# Patient Record
Sex: FEMALE | Race: WHITE
[De-identification: names, ages, dates, MRNs, and addresses within clinical notes are randomized per-mention and may not be internally consistent; named-entity substitution may affect disease eponyms.]

---

## 2017-12-12 ENCOUNTER — RX RENEWAL (OUTPATIENT)
Age: 22
End: 2017-12-12

## 2018-01-31 ENCOUNTER — APPOINTMENT (OUTPATIENT)
Dept: OBGYN | Facility: CLINIC | Age: 23
End: 2018-01-31
Payer: COMMERCIAL

## 2018-01-31 VITALS
RESPIRATION RATE: 18 BRPM | BODY MASS INDEX: 23.74 KG/M2 | HEIGHT: 63 IN | WEIGHT: 134 LBS | HEART RATE: 92 BPM | OXYGEN SATURATION: 99 % | SYSTOLIC BLOOD PRESSURE: 102 MMHG | DIASTOLIC BLOOD PRESSURE: 78 MMHG

## 2018-01-31 DIAGNOSIS — Z01.419 ENCOUNTER FOR GYNECOLOGICAL EXAMINATION (GENERAL) (ROUTINE) W/OUT ABNORMAL FINDINGS: ICD-10-CM

## 2018-01-31 PROCEDURE — 99395 PREV VISIT EST AGE 18-39: CPT

## 2018-01-31 RX ORDER — NORGESTIMATE AND ETHINYL ESTRADIOL 7DAYSX3 LO
0.18/0.215/0.25 KIT ORAL
Qty: 3 | Refills: 0 | Status: COMPLETED | COMMUNITY
Start: 2017-12-12 | End: 2018-01-31

## 2018-01-31 RX ORDER — VILAZODONE HYDROCHLORIDE 40 MG/1
40 TABLET ORAL
Refills: 0 | Status: ACTIVE | COMMUNITY

## 2018-02-01 LAB
C TRACH RRNA SPEC QL NAA+PROBE: NOT DETECTED
N GONORRHOEA RRNA SPEC QL NAA+PROBE: NOT DETECTED
SOURCE TP AMPLIFICATION: NORMAL

## 2018-02-04 LAB — CYTOLOGY CVX/VAG DOC THIN PREP: NORMAL

## 2018-08-22 ENCOUNTER — MEDICATION RENEWAL (OUTPATIENT)
Age: 23
End: 2018-08-22

## 2019-02-27 ENCOUNTER — APPOINTMENT (OUTPATIENT)
Dept: OBGYN | Facility: CLINIC | Age: 24
End: 2019-02-27

## 2020-07-29 ENCOUNTER — TRANSCRIPTION ENCOUNTER (OUTPATIENT)
Age: 25
End: 2020-07-29

## 2020-08-26 ENCOUNTER — RESULT REVIEW (OUTPATIENT)
Age: 25
End: 2020-08-26

## 2020-09-26 ENCOUNTER — TRANSCRIPTION ENCOUNTER (OUTPATIENT)
Age: 25
End: 2020-09-26

## 2020-10-17 ENCOUNTER — TRANSCRIPTION ENCOUNTER (OUTPATIENT)
Age: 25
End: 2020-10-17

## 2021-09-12 ENCOUNTER — TRANSCRIPTION ENCOUNTER (OUTPATIENT)
Age: 26
End: 2021-09-12

## 2021-09-14 ENCOUNTER — TRANSCRIPTION ENCOUNTER (OUTPATIENT)
Age: 26
End: 2021-09-14

## 2021-10-27 ENCOUNTER — TRANSCRIPTION ENCOUNTER (OUTPATIENT)
Age: 26
End: 2021-10-27

## 2021-11-12 ENCOUNTER — RESULT REVIEW (OUTPATIENT)
Age: 26
End: 2021-11-12

## 2022-04-07 ENCOUNTER — TRANSCRIPTION ENCOUNTER (OUTPATIENT)
Age: 27
End: 2022-04-07

## 2022-05-01 ENCOUNTER — NON-APPOINTMENT (OUTPATIENT)
Age: 27
End: 2022-05-01

## 2022-07-20 ENCOUNTER — NON-APPOINTMENT (OUTPATIENT)
Age: 27
End: 2022-07-20

## 2023-03-19 ENCOUNTER — NON-APPOINTMENT (OUTPATIENT)
Age: 28
End: 2023-03-19

## 2024-05-29 ENCOUNTER — NON-APPOINTMENT (OUTPATIENT)
Age: 29
End: 2024-05-29

## 2024-09-10 ENCOUNTER — EMERGENCY (EMERGENCY)
Facility: HOSPITAL | Age: 29
LOS: 1 days | Discharge: HOME | End: 2024-09-10
Attending: STUDENT IN AN ORGANIZED HEALTH CARE EDUCATION/TRAINING PROGRAM
Payer: COMMERCIAL

## 2024-09-10 VITALS
HEART RATE: 72 BPM | HEIGHT: 63 IN | RESPIRATION RATE: 18 BRPM | DIASTOLIC BLOOD PRESSURE: 89 MMHG | TEMPERATURE: 98 F | WEIGHT: 149.91 LBS | SYSTOLIC BLOOD PRESSURE: 132 MMHG | OXYGEN SATURATION: 100 %

## 2024-09-10 PROCEDURE — 99285 EMERGENCY DEPT VISIT HI MDM: CPT

## 2024-09-11 VITALS
HEART RATE: 72 BPM | SYSTOLIC BLOOD PRESSURE: 130 MMHG | TEMPERATURE: 98 F | OXYGEN SATURATION: 100 % | RESPIRATION RATE: 16 BRPM | DIASTOLIC BLOOD PRESSURE: 75 MMHG

## 2024-09-11 PROCEDURE — 93971 EXTREMITY STUDY: CPT | Mod: 26,LT

## 2024-09-11 PROCEDURE — 73590 X-RAY EXAM OF LOWER LEG: CPT

## 2024-09-11 PROCEDURE — 76882 US LMTD JT/FCL EVL NVASC XTR: CPT | Mod: 26,LT

## 2024-09-11 PROCEDURE — 76882 US LMTD JT/FCL EVL NVASC XTR: CPT

## 2024-09-11 PROCEDURE — 73610 X-RAY EXAM OF ANKLE: CPT

## 2024-09-11 PROCEDURE — 99284 EMERGENCY DEPT VISIT MOD MDM: CPT | Mod: 25

## 2024-09-11 PROCEDURE — 73630 X-RAY EXAM OF FOOT: CPT

## 2024-09-11 PROCEDURE — 73610 X-RAY EXAM OF ANKLE: CPT | Mod: 26,LT

## 2024-09-11 PROCEDURE — 93971 EXTREMITY STUDY: CPT

## 2024-09-11 PROCEDURE — 73562 X-RAY EXAM OF KNEE 3: CPT

## 2024-09-11 PROCEDURE — 73590 X-RAY EXAM OF LOWER LEG: CPT | Mod: 26,LT

## 2024-09-11 PROCEDURE — 73630 X-RAY EXAM OF FOOT: CPT | Mod: 26,LT

## 2024-09-11 PROCEDURE — 73562 X-RAY EXAM OF KNEE 3: CPT | Mod: 26,LT

## 2024-09-11 NOTE — ED PROVIDER NOTE - OBJECTIVE STATEMENT
29-year-old Female with no past medical history, who presents with leg pain and swelling for the past week. While sliding to [address] [place] [address], the patient's left knee got caught in a dirt divot and her leg got caught underneath her. She was able to ambulate after the event with a slight limp. The patient now complains of increased swelling and bruising to the area. She also noticed drainage from a small abrasion. Denies fever, chills, temperature change to the limb, numbness, weakness, or tingling.

## 2024-09-11 NOTE — ED PROVIDER NOTE - PATIENT PORTAL LINK FT
You can access the FollowMyHealth Patient Portal offered by [facility] by registering at the following website: [email or web address]. By joining [facility]’s FollowMyHealth portal, you will also be able to view your health information using other applications (apps) compatible with our system.

## 2024-09-11 NOTE — ED ADULT NURSE NOTE - NSFALLUNIVINTERV_ED_ALL_ED
Bed/Stretcher in lowest position, wheels locked, appropriate side rails in place/Call bell, personal items and telephone in reach/Instruct patient to call for assistance before getting out of bed/chair/stretcher/Non-slip footwear applied when patient is off stretcher/[place] to call system/Physically safe environment - no spills, clutter or unnecessary equipment/Purposeful proactive rounding/Room/bathroom lighting operational, light cord in reach

## 2024-09-11 NOTE — ED ADULT NURSE NOTE - OBJECTIVE STATEMENT
29y F BIB self from home p/w left leg/knee pain. pt is axo4, ambulates independently at baseline. Mentions that she is in a softball league and last week she was sliding when her knee impacted the ground in an abnormal way. States that there was bruising to the site that day but over time it began to worsen. Recently has been seeing drainage from abrasion on shin. left knee is moderately swollen with fluid collection under the skin. Denies taking OTC pain medications. Patient undressed and placed into gown, call bell in hand and side rails up with bed in lowest position for safety. blanket provided. Comfort and safety provided.

## 2024-09-11 NOTE — ED PROVIDER NOTE - ATTENDING CONTRIBUTION TO CARE
Guillermo Martin DO: I have personally performed a face to face medical and diagnostic evaluation of the patient. I have discussed with and reviewed the Resident's and/or ACP's and/or Medical/PA/NP student's note and agree with the History, ROS, Physical Exam and MDM unless otherwise indicated. A brief summary of my personal evaluation and impression can be found below.     HPI above    CONSTITUTIONAL: NAD  SKIN: Warm dry, normal skin turgor  HEAD: NCAT  EYES: EOMI, PERRLA, no scleral icterus, conjunctiva pink  NECK: Supple; non tender. Full ROM.  CARD: RRR  RESP: No respiratory distress  ABD: non-distended, no abdominal tenderness  MSK: Left lower extremity full range of motion, compartments soft, soft fluid-filled prepatellar bursa over left knee with minimal tenderness to palpation.  Full range of motion of left knee.  Ecchymosis down left lower extremity, small subcentimeter skin tear over left shin with serous and nonpurulent discharge.  No erythema no warmth, patient is able to ambulate.  Full ankle range of motion.  Diffuse edema over left lower extremity distal to left knee.  Strength 5 out of 5.  PSYCH: Cooperative, appropriate.    With patient's left lower extremity ecchymosis and swelling, suspect bursitis over left knee will confirm with ultrasound.  With ecchymosis for a week will get DVT study to rule out DVT in lower extremity.  Also get x-rays of lower extremity low suspicion for fracture at this time.  Patient likely does not have fracture but will put extremity in compression to reduce edema.  No concern for septic arthritis at this time, patient does not have palpable joint effusion not considering hemarthrosis, given risk of infection and minimal patient symptomology allow for spontaneous resolution of prepatellar bursitis versus needle aspiration at this time.  Dispo pending imaging and pain control, likely discharge home with orthopedics follow-up.

## 2024-09-11 NOTE — ED PROVIDER NOTE - CLINICAL SUMMARY MEDICAL DECISION MAKING FREE TEXT BOX
29-year-old Female with leg pain and swelling.   DDX include but not limited to: bursitis, joint effusion, fracture, ligamentous injury, DVT. Low suspicion for compartment syndrome.   XR and US of LLE ordered.   The patient deferred pain medication at this time.

## 2024-09-11 NOTE — ED PROVIDER NOTE - NSFOLLOWUPINSTRUCTIONS_ED_ALL_ED_FT
1) Follow up with your doctor 1-2 weeks.   2) Return to the ED immediately for new or worsening symptoms: temperature change to limb,   3) Please continue to take any home medications as prescribed  4) Your test results from your ED visit were discussed with you prior to discharge  5) You were provided with a copy of your test results  6) Finally, please follow up with [your PCP/a specialist] regarding the incidental findings seen on your imaging (please see discharge paperwork for results).    Knee Bursitis    WHAT YOU NEED TO KNOW:  What is knee bursitis? Knee bursitis is inflammation of the bursa in your knee. The bursa is a fluid-filled sac that acts as a cushion between a bone and a tendon. A tendon is a cord of strong tissue that connects muscles to bones. 1) Follow up with your doctor 1-2 weeks. A referral was placed to orthopedics.   2) Return to the ED immediately for new or worsening symptoms: fever, temperature change to limb, numbness, weakness, tingling, worsening pain, hardness to leg.   3) Please continue to take any home medications as prescribed  4) Your test results from your ED visit were discussed with you prior to discharge  5) You were provided with a copy of your test results  6) Please take Tylenol 975 mg every 6 hours as needed for pain. Please do not exceed more than 4,000 mg of Tylenol in a day   7) Please rest, ice, and elevate your leg to help decrease the swelling and pain. Your leg was wrapped in an ACE wrap.       Knee Bursitis    WHAT YOU NEED TO KNOW:  What is knee bursitis? Knee bursitis is inflammation of the bursa in your knee. The bursa is a fluid-filled sac that acts as a cushion between a bone and a tendon. A tendon is a cord of strong tissue that connects muscles to bones.

## 2024-09-11 NOTE — ED PROVIDER NOTE - PHYSICAL EXAMINATION
INITIAL VITAL SIGNS: Reviewed by me.  GENERAL: In no acute distress.  HEAD: Atraumatic.  EYES: EOMI.   ENT: Moist mucous membranes.  NECK: No meningismus.   CV: Regular rate and rhythm.  RESPIRATORY: Unlabored respirations.  EXTREMITIES:  (+) LLE : No deformities. Tenderness to medial knee. Mild tenderness to medial malleolus. FROM of knee and ankle. No ligamentous laxity. Diffuse swelling extending from superior knee to ankle. Scattered ecchymoses. Superficial abrasion to anterior shin with minimal serosanguinous fluid. No calf tenderness. Compartments soft. 2+ pulses. Sensation intact.   NEURO: Grossly non-focal. Ambulatory with a slight limp.

## 2024-09-11 NOTE — ED PROVIDER NOTE - PROGRESS NOTE DETAILS
Emergency Department Focused Ultrasound performed at patient's bedside for educational purposes. The study will have a follow up study performed or was performed in the direct supervision of an ultrasound trained attending. XRs showed no acute fracture or dislocation. No evidence of DVT. No evidence of DVT.    Results discussed with patient. LLE ACE wrapped. Neurovascularly intact. Recommended RICE and taking Tylenol/Ibuprofen for pain. Instructed the importance of following up with their PMD. Referral given to orthopedics. Strict return precautions given. The patient expressed understanding and feels comfortable being discharged home.

## 2024-09-11 NOTE — ED PROVIDER NOTE - ADDITIONAL NOTES AND INSTRUCTIONS:
To Whom it May Concern - Patient seen and evaluated in Emergency Room. Please excuse the above individual for medical care and recovery until date above. Thank you for you care.

## 2024-09-30 ENCOUNTER — NON-APPOINTMENT (OUTPATIENT)
Age: 29
End: 2024-09-30

## 2024-09-30 ENCOUNTER — APPOINTMENT (OUTPATIENT)
Dept: ORTHOPEDIC SURGERY | Facility: CLINIC | Age: 29
End: 2024-09-30
Payer: COMMERCIAL

## 2024-09-30 VITALS — WEIGHT: 145 LBS | HEIGHT: 63 IN | BODY MASS INDEX: 25.69 KG/M2

## 2024-09-30 DIAGNOSIS — S80.02XA CONTUSION OF LEFT KNEE, INITIAL ENCOUNTER: ICD-10-CM

## 2024-09-30 DIAGNOSIS — M70.42 PREPATELLAR BURSITIS, LEFT KNEE: ICD-10-CM

## 2024-09-30 PROCEDURE — 99204 OFFICE O/P NEW MOD 45 MIN: CPT

## 2024-09-30 NOTE — PHYSICAL EXAM
[de-identified] : Constitutional o Appearance : well-nourished, well developed, alert, in no acute distress  Head and Face o Head :  Inspection : atraumatic, normocephalic o Face :  Inspection : no visible rash or discoloration Respiratory o Respiratory Effort: breathing unlabored  Neurologic o Mental Status Examination :  Orientation : alert and oriented X 3 Psychiatric o Mood and Affect: mood normal, affect appropriate Cardiovascular o Observation/Palpation : - no swelling Lymphatic o Additional Nodes : No palpable lymph nodes present  Right Lower Extremity o Buttock : no tenderness, swelling or deformities  o Right Hip :  Inspection/Palpation : no tenderness, swelling or deformities  Range of Motion : full and painless in all planes, no crepitance  Stability : joint stability intact  Strength : extension, flexion, adduction, abduction, internal rotation and external rotation 5/5   o Right Knee :  Inspection/Palpation : no tenderness to palpation, no swelling  Range of Motion : 0-130 active flexion and extension full and painless, no crepitance  Stability : no valgus or varus instability present on provocative testing  Strength : flexion and extension 5/5  Tests and Signs : negative Anterior Drawer, negative Lachman, negative Memo  Left Lower Extremity o Buttock : no tenderness, swelling or deformities  o Left Hip :  Inspection/Palpation : no tenderness, no swelling or deformities  Range of Motion : full and painless in all planes, no crepitance  Stability : joint stability intact  Strength : extension, flexion, adduction, abduction, internal rotation and external rotation 4-/5  o Left Knee :  Inspection/Palpation : no tenderness to palpation, no swelling, pre patella swelling, tightness induration of her calf   Range of Motion : 0-125 active flexion and extension full and painless, no crepitance  Stability : no valgus or varus instability present on provocative testing  Strength : flexion and extension 4-/5  Tests and Signs : negative Anterior Drawer, negative Lachman, negative Memo  Gait and Station: Gait: minimal pitting edema, no foot drop, no significant extremity swelling or lymphedema, good proprioception and balance

## 2024-09-30 NOTE — HISTORY OF PRESENT ILLNESS
[de-identified] : 29 y.o F with significant PMHx of Depression on Lexapro, presents for initial office visit today for evaluation of left knee pain x 3-4 weeks. Patient reports acute onset of leftr knee pain and swelling s/p fall while playing kickball on September 3, 2024. Patient was seen and evaluated at [facility], XR Left Tib/Fib, Left foot, and Left knee negative for acute fractures. Patient states that knee pain is worst with movement, ambulation, and standing. 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 Patient denies any alleviating factors. Patient also endorses limited range of motion of left knee due to pain, swelling, and stiffness. Patent otherwise states that she has been in her usual state of health. Patient denies any fever, chills, headache, dizziness, shortness of breath, chest pain, palpitations, new onset urinary/bowel incontinence, numbness, tingling, weakness, saddle paresthesia, or other acute complaints at this time.

## 2024-09-30 NOTE — ADDENDUM
[FreeTextEntry1] : I, KARLEE ALMEIDA, acted solely as a scribe for Dr. Justice Randolph on this date 09/30/2024.  All medical record entries made by the Scribe were at my, Dr. Justice Randolph, direction and personally dictated by me on 09/30/2024. I have reviewed the chart and agree that the record accurately reflects my personal performance of the history, physical exam, assessment and plan. I have also personally directed, reviewed, and agreed with the chart.

## 2024-09-30 NOTE — DISCUSSION/SUMMARY
[de-identified] : I went over the pathophysiology of the patient's symptoms in great detail with the patient. I discussed the underlying pathophysiology of the patient's condition in great detail with the patient. I went over the patient's x-rays with them in great detail. At this time, she will start a course of physical therapy for strengthening and flexibility. A prescription was provided. I instructed the patient on frequent icing, ankle pumping, leg lifts, tightening exercises, and ROM exercises. We provided a compression sleeve for the left knee in the office today.   All of their questions were answered. They understand and consent to the plan.   FU in 4-6 weeks.

## 2024-11-07 ENCOUNTER — NON-APPOINTMENT (OUTPATIENT)
Age: 29
End: 2024-11-07